# Patient Record
Sex: MALE | Race: OTHER | NOT HISPANIC OR LATINO | Employment: FULL TIME | URBAN - METROPOLITAN AREA
[De-identification: names, ages, dates, MRNs, and addresses within clinical notes are randomized per-mention and may not be internally consistent; named-entity substitution may affect disease eponyms.]

---

## 2023-01-30 ENCOUNTER — TELEPHONE (OUTPATIENT)
Dept: UROLOGY | Facility: CLINIC | Age: 38
End: 2023-01-30

## 2023-01-30 NOTE — TELEPHONE ENCOUNTER
Call placed to patient. Name and date of birth verified. Inquiring vasectomy reversal. Patient offered assistance with scheduling consult and declined. Patient stated he will call back at a later date to scheduled. Patient provided office contact information. Patient verbalized understanding.

## 2023-01-30 NOTE — TELEPHONE ENCOUNTER
----- Message from Karen Whittington LPN sent at 1/26/2023  4:08 PM CST -----  Contact: [phone]    ----- Message -----  From: Nichelle Samuels MA  Sent: 1/26/2023   1:31 PM CST  To: [facility] Staff    Pt would like to know the effectiveness of the reversal vasectomy. Pt would like a call back at [phone]

## 2023-04-27 ENCOUNTER — TELEPHONE (OUTPATIENT)
Dept: UROLOGY | Facility: CLINIC | Age: 38
End: 2023-04-27

## 2023-04-27 NOTE — TELEPHONE ENCOUNTER
----- Message from Maria C Sanders MA sent at 4/26/2023  8:54 AM CDT -----  Regarding: FW: Reversed Vasectomy  Contact: pt. [phone]  Please advise  ----- Message -----  From: Gigi EL Route  Sent: 4/25/2023   4:51 PM CDT  To: [facility] Staff  Subject: Reversed Vasectomy                               Pt. Is calling in ref to discussing a vasectomy reversal. H states it has been 9 yrs since vasectomy and has questions about success rate.  Pt. States he is self pay  and wanting to do initial consult. Patient Requesting Call Back @ pt.  [phone]

## 2023-04-27 NOTE — TELEPHONE ENCOUNTER
Call placed to patient. Name and date of birth verified. Patient informed of scheduled appointment with Dr. Stephens for vas reversal consult noted in epic. Patient informed appointment details are noted in patient portal. Patient verbalized understanding.

## 2023-05-04 ENCOUNTER — PATIENT MESSAGE (OUTPATIENT)
Dept: UROLOGY | Facility: CLINIC | Age: 38
End: 2023-05-04

## 2023-05-10 ENCOUNTER — TELEPHONE (OUTPATIENT)
Dept: UROLOGY | Facility: CLINIC | Age: 38
End: 2023-05-10

## 2023-05-10 NOTE — TELEPHONE ENCOUNTER
Call placed to patient. Name and date of birth verified. Patient inquiring pricing for Vas reversal. Patient stated he was able to speak with someone in [employer].

## 2023-05-10 NOTE — TELEPHONE ENCOUNTER
----- Message from Karen Whittington LPN sent at 5/9/2023  3:44 PM CDT -----  Regarding: FW: Missed Call  Contact: [phone]    ----- Message -----  From: Sushma Willard  Sent: 5/9/2023   3:42 PM CDT  To: [facility] Staff  Subject: Missed Call                                      Calling in regards to a missed call. Please call pt back to discuss